# Patient Record
Sex: MALE | Race: BLACK OR AFRICAN AMERICAN | NOT HISPANIC OR LATINO | URBAN - METROPOLITAN AREA
[De-identification: names, ages, dates, MRNs, and addresses within clinical notes are randomized per-mention and may not be internally consistent; named-entity substitution may affect disease eponyms.]

---

## 2022-03-11 ENCOUNTER — EMERGENCY (EMERGENCY)
Facility: HOSPITAL | Age: 27
LOS: 1 days | Discharge: HOME | End: 2022-03-11
Attending: EMERGENCY MEDICINE | Admitting: EMERGENCY MEDICINE
Payer: COMMERCIAL

## 2022-03-11 VITALS
DIASTOLIC BLOOD PRESSURE: 86 MMHG | RESPIRATION RATE: 24 BRPM | HEART RATE: 138 BPM | TEMPERATURE: 98 F | SYSTOLIC BLOOD PRESSURE: 131 MMHG | OXYGEN SATURATION: 100 %

## 2022-03-11 VITALS
OXYGEN SATURATION: 100 % | DIASTOLIC BLOOD PRESSURE: 77 MMHG | RESPIRATION RATE: 20 BRPM | SYSTOLIC BLOOD PRESSURE: 115 MMHG | TEMPERATURE: 98 F | HEART RATE: 98 BPM

## 2022-03-11 LAB
ALBUMIN SERPL ELPH-MCNC: 4.4 G/DL — SIGNIFICANT CHANGE UP (ref 3.3–5)
ALP SERPL-CCNC: 75 U/L — SIGNIFICANT CHANGE UP (ref 40–120)
ALT FLD-CCNC: 19 U/L — SIGNIFICANT CHANGE UP (ref 4–41)
ANION GAP SERPL CALC-SCNC: 15 MMOL/L — HIGH (ref 7–14)
APAP SERPL-MCNC: <10 UG/ML — LOW (ref 15–25)
AST SERPL-CCNC: 19 U/L — SIGNIFICANT CHANGE UP (ref 4–40)
BASE EXCESS BLDV CALC-SCNC: -1.3 MMOL/L — SIGNIFICANT CHANGE UP (ref -2–3)
BASOPHILS # BLD AUTO: 0.02 K/UL — SIGNIFICANT CHANGE UP (ref 0–0.2)
BASOPHILS NFR BLD AUTO: 0.3 % — SIGNIFICANT CHANGE UP (ref 0–2)
BILIRUB SERPL-MCNC: 0.5 MG/DL — SIGNIFICANT CHANGE UP (ref 0.2–1.2)
BLOOD GAS VENOUS COMPREHENSIVE RESULT: SIGNIFICANT CHANGE UP
BUN SERPL-MCNC: 18 MG/DL — SIGNIFICANT CHANGE UP (ref 7–23)
CALCIUM SERPL-MCNC: 9.3 MG/DL — SIGNIFICANT CHANGE UP (ref 8.4–10.5)
CHLORIDE BLDV-SCNC: 102 MMOL/L — SIGNIFICANT CHANGE UP (ref 96–108)
CHLORIDE SERPL-SCNC: 102 MMOL/L — SIGNIFICANT CHANGE UP (ref 98–107)
CO2 BLDV-SCNC: 24.9 MMOL/L — SIGNIFICANT CHANGE UP (ref 22–26)
CO2 SERPL-SCNC: 21 MMOL/L — LOW (ref 22–31)
CREAT SERPL-MCNC: 1.16 MG/DL — SIGNIFICANT CHANGE UP (ref 0.5–1.3)
D DIMER BLD IA.RAPID-MCNC: <150 NG/ML DDU — SIGNIFICANT CHANGE UP
EGFR: 90 ML/MIN/1.73M2 — SIGNIFICANT CHANGE UP
EOSINOPHIL # BLD AUTO: 0.02 K/UL — SIGNIFICANT CHANGE UP (ref 0–0.5)
EOSINOPHIL NFR BLD AUTO: 0.3 % — SIGNIFICANT CHANGE UP (ref 0–6)
ETHANOL SERPL-MCNC: <10 MG/DL — SIGNIFICANT CHANGE UP
GAS PNL BLDV: 135 MMOL/L — LOW (ref 136–145)
GAS PNL BLDV: SIGNIFICANT CHANGE UP
GLUCOSE BLDV-MCNC: 143 MG/DL — HIGH (ref 70–99)
GLUCOSE SERPL-MCNC: 140 MG/DL — HIGH (ref 70–99)
HCO3 BLDV-SCNC: 24 MMOL/L — SIGNIFICANT CHANGE UP (ref 22–29)
HCT VFR BLD CALC: 45 % — SIGNIFICANT CHANGE UP (ref 39–50)
HCT VFR BLDA CALC: 47 % — SIGNIFICANT CHANGE UP (ref 39–51)
HGB BLD CALC-MCNC: 15.5 G/DL — SIGNIFICANT CHANGE UP (ref 13–17)
HGB BLD-MCNC: 15.4 G/DL — SIGNIFICANT CHANGE UP (ref 13–17)
IANC: 4.29 K/UL — SIGNIFICANT CHANGE UP (ref 1.5–8.5)
IMM GRANULOCYTES NFR BLD AUTO: 0.6 % — SIGNIFICANT CHANGE UP (ref 0–1.5)
LACTATE BLDV-MCNC: 2.3 MMOL/L — HIGH (ref 0.5–2)
LACTATE SERPL-SCNC: 1.4 MMOL/L — SIGNIFICANT CHANGE UP (ref 0.5–2)
LYMPHOCYTES # BLD AUTO: 1.51 K/UL — SIGNIFICANT CHANGE UP (ref 1–3.3)
LYMPHOCYTES # BLD AUTO: 24.3 % — SIGNIFICANT CHANGE UP (ref 13–44)
MCHC RBC-ENTMCNC: 30.9 PG — SIGNIFICANT CHANGE UP (ref 27–34)
MCHC RBC-ENTMCNC: 34.2 GM/DL — SIGNIFICANT CHANGE UP (ref 32–36)
MCV RBC AUTO: 90.2 FL — SIGNIFICANT CHANGE UP (ref 80–100)
MONOCYTES # BLD AUTO: 0.33 K/UL — SIGNIFICANT CHANGE UP (ref 0–0.9)
MONOCYTES NFR BLD AUTO: 5.3 % — SIGNIFICANT CHANGE UP (ref 2–14)
NEUTROPHILS # BLD AUTO: 4.29 K/UL — SIGNIFICANT CHANGE UP (ref 1.8–7.4)
NEUTROPHILS NFR BLD AUTO: 69.2 % — SIGNIFICANT CHANGE UP (ref 43–77)
NRBC # BLD: 0 /100 WBCS — SIGNIFICANT CHANGE UP
NRBC # FLD: 0 K/UL — SIGNIFICANT CHANGE UP
PCO2 BLDV: 40 MMHG — LOW (ref 42–55)
PH BLDV: 7.38 — SIGNIFICANT CHANGE UP (ref 7.32–7.43)
PLATELET # BLD AUTO: 160 K/UL — SIGNIFICANT CHANGE UP (ref 150–400)
PO2 BLDV: 54 MMHG — SIGNIFICANT CHANGE UP
POTASSIUM BLDV-SCNC: 3.7 MMOL/L — SIGNIFICANT CHANGE UP (ref 3.5–5.1)
POTASSIUM SERPL-MCNC: 3.8 MMOL/L — SIGNIFICANT CHANGE UP (ref 3.5–5.3)
POTASSIUM SERPL-SCNC: 3.8 MMOL/L — SIGNIFICANT CHANGE UP (ref 3.5–5.3)
PROT SERPL-MCNC: 7.5 G/DL — SIGNIFICANT CHANGE UP (ref 6–8.3)
RBC # BLD: 4.99 M/UL — SIGNIFICANT CHANGE UP (ref 4.2–5.8)
RBC # FLD: 12.4 % — SIGNIFICANT CHANGE UP (ref 10.3–14.5)
SALICYLATES SERPL-MCNC: <0.3 MG/DL — LOW (ref 15–30)
SAO2 % BLDV: 84.2 % — SIGNIFICANT CHANGE UP
SODIUM SERPL-SCNC: 138 MMOL/L — SIGNIFICANT CHANGE UP (ref 135–145)
TOXICOLOGY SCREEN, DRUGS OF ABUSE, SERUM RESULT: SIGNIFICANT CHANGE UP
WBC # BLD: 6.21 K/UL — SIGNIFICANT CHANGE UP (ref 3.8–10.5)
WBC # FLD AUTO: 6.21 K/UL — SIGNIFICANT CHANGE UP (ref 3.8–10.5)

## 2022-03-11 PROCEDURE — 71045 X-RAY EXAM CHEST 1 VIEW: CPT | Mod: 26

## 2022-03-11 PROCEDURE — 93010 ELECTROCARDIOGRAM REPORT: CPT | Mod: NC

## 2022-03-11 PROCEDURE — 99285 EMERGENCY DEPT VISIT HI MDM: CPT | Mod: 25

## 2022-03-11 RX ORDER — SODIUM CHLORIDE 9 MG/ML
1000 INJECTION INTRAMUSCULAR; INTRAVENOUS; SUBCUTANEOUS ONCE
Refills: 0 | Status: COMPLETED | OUTPATIENT
Start: 2022-03-11 | End: 2022-03-11

## 2022-03-11 RX ORDER — ONDANSETRON 8 MG/1
4 TABLET, FILM COATED ORAL ONCE
Refills: 0 | Status: COMPLETED | OUTPATIENT
Start: 2022-03-11 | End: 2022-03-11

## 2022-03-11 RX ADMIN — SODIUM CHLORIDE 1000 MILLILITER(S): 9 INJECTION INTRAMUSCULAR; INTRAVENOUS; SUBCUTANEOUS at 06:24

## 2022-03-11 RX ADMIN — ONDANSETRON 4 MILLIGRAM(S): 8 TABLET, FILM COATED ORAL at 06:07

## 2022-03-11 RX ADMIN — Medication 1 MILLIGRAM(S): at 06:07

## 2022-03-11 NOTE — ED PROVIDER NOTE - CLINICAL SUMMARY MEDICAL DECISION MAKING FREE TEXT BOX
Patient is a 26y M PMHx anxiety (no medications), presenting today with SOB and vomiting. Lungs CTAB, no acute respiratory distress or compromise, pupils equal and reactive to light, not pinpoint. No suspicion of opioid overdose. Patient likely with marijuana effects from high dose. Uncertain how much he actually took. Will get labs, serum tox, give fluids, zofran, ativan. Will reassess after meds. Continue to monitor HR.

## 2022-03-11 NOTE — ED PROVIDER NOTE - PATIENT PORTAL LINK FT
You can access the FollowMyHealth Patient Portal offered by [facility] by registering at the following website: [email or web address]. By joining [employer]’s FollowMyHealth portal, you will also be able to view your health information using other applications (apps) compatible with our system.

## 2022-03-11 NOTE — ED PROVIDER NOTE - NSFOLLOWUPINSTRUCTIONS_ED_ALL_ED_FT
Preventing Marijuana Misuse    Marijuana is a mixture of the dried leaves and flowers of the hemp plant Cannabis sativa. The plant's active ingredients (cannabinoids) change the chemistry of the brain. If you smoke or eat marijuana, you will experience changes in the way you think, feel, and behave.    What is marijuana used for?  In some cases, marijuana is prescribed by a health care provider (medical marijuana) for temporary relief from a medical condition. It can have medical effects, such as:    Reduced nausea.   Increased appetite.   Reduced muscle spasm.   Pain relief.  Anxiety relief.    Many people use marijuana for recreational purposes because it helps them relax and puts them in a pleasurable mood (marijuana high).    How can marijuana use affect me?  Marijuana affects you both mentally and physically. Using marijuana can make you feel high and relaxed. It can also have negative effects, both short term and long term. When used for recreational purposes, marijuana is often used in higher doses and for longer periods. High doses of marijuana can cause unpleasant side effects. It is also possible to become addicted to this drug.    Short-term effects of marijuana use include:    Changes in mood and perception, such as an altered sense of time.  Increased heart rate.  Increased appetite.  Slowed movement, coordination, and reaction time.  Poor memory, judgment, and problem-solving ability.  Drowsiness.  Bloodshot eyes and changes in vision.  Coughing.    High doses of marijuana can cause:    Panic.   Anxiety.   Mental confusion.  Severe dizziness.  Vomiting.  Hallucinations. This means you see, hear, taste, smell, or feel things that are not real.  Coma.    What can happen if I keep using marijuana?  If you use marijuana for a long time, it can have long-term effects such as:    Higher risk of health problems, such as:     Lung and breathing problems.   Possible higher risk of heart and cardiovascular problems or testicular cancer.  Mental and physical dependence (addiction).  Slowed brain development in young people. Babies whose mothers used marijuana during pregnancy may have an increased risk of problems with brain development and behavior.  Hallucinations or temporary periods of false perceptions or beliefs (paranoia).  Worsening of mental illness or onset of new mental illness. This can include anxiety, depression, or suicidal thoughts.  Difficulty maintaining healthy relationships.   Poor memory and difficulty concentrating and learning. This can result in decreased intelligence, poor performance at school or work, and an increased risk of dropping out of school.  Higher risk of using other substances like alcohol, nicotine, and illegal drugs.  A condition called cannabinoid hyperemesis syndrome. This causes repeated episodes of intense abdominal pain, nausea, and vomiting.    Quitting marijuana after using it for a long time can cause withdrawal symptoms, such as:    Headache.   Shakiness.   Cravings for the drug.   Sweating.   Stomach pain, nausea, and vomiting.  Restlessness and trouble sleeping.   Anxiety, irritability, and anger.   Decreased appetite.    What are the benefits of not using marijuana?  Not using marijuana can keep you from becoming dependent on it. You can avoid the drug's negative effects on your quality of life. You can avoid accidents caused by the slowed reaction time and decreased thinking ability that are common with marijuana use and abuse. You can also prevent the long-term effects that this drug can cause.    What actions can I take to stop using marijuana?     If you are not physically or mentally dependent on marijuana, you should be able to stop using it on your own. Taking these actions may help:    Find healthy ways to cope with stress, such as exercise, meditation, or spending time with family and friends. Talk with your health care provider about how you feel and how to cope with stress.   Spend time with people who do not use marijuana, or make new friends who do not use marijuana.   Do something else instead of using marijuana. You can exercise, take up a hobby, or participate in activities that you can do with others.   Do not be afraid to say no if someone offers you marijuana. Speak up about why you do not want to use drugs. You can be a positive role model for others.    If you cannot stop on your own, ask your health care provider for help. Treatment for marijuana addiction is similar to treatment for other addictions. It may include:    Cognitive-behavioral therapy (psychotherapy). This may include individual or group therapy.  Joining a support group.   Treating medical, behavioral, or mental health conditions that exist along with marijuana dependency.    Where to find more information  Learn more about:    Marijuana from the [place] National [place] on Drug Abuse: www.drugabuse.gov  Medical marijuana from the National Institutes of Health: nccih.nih.gov  Treatment options from the Substance Abuse and Mental Health Services Administration: samhsa.gov  Recovery from marijuana dependency from Recovery.org: www.recovery.org    Contact a health care provider if:  You want to stop using marijuana but you cannot.  You have withdrawal symptoms when you try to stop using marijuana.  You are using marijuana every day.  You need to use increasing amounts of marijuana to get the same desired effect.  You are using marijuana along with other drugs like cocaine or alcohol.  You have anxiety or depression.  You have hallucinations or paranoia.  Marijuana use is interfering with your relationships or your ability to function normally at school or at work.    Get help right away if:  You develop severe chest pain, dizziness, or shortness of breath.  You have severe abdominal pain, nausea, and vomiting.    Summary  Using marijuana can make you feel high and relaxed, and if used properly, it can have some medical benefits. However, it can also have negative effects, both short term and long term.  High doses of marijuana can cause unpleasant side effects. These can be both physical and mental.  Marijuana has the potential to cause physical dependence and even addiction.  Long-term use may interfere with your ability to function normally at home, school, or work. It can sometimes lead to using other substances like alcohol, nicotine, and illegal drugs.  If you need help to stop using marijuana, ask your health care provider. Marijuana addiction can be treated.    ADDITIONAL NOTES AND INSTRUCTIONS    Please follow up with your Primary MD in 24-48 hr.  Seek immediate medical care for any new/worsening signs or symptoms.

## 2022-03-11 NOTE — ED ADULT NURSE NOTE - CHIEF COMPLAINT QUOTE
Patient used marijuana and ETOH intake last night at a party, per aunt, this morning patient anxious and complaining of SOB. Patient anxious, rambling and stating" this is not really, I'm not suppose to be here." Actively vomiting in triage. Poor historian and unable to provide any additional information. History of Anxiety.

## 2022-03-11 NOTE — ED PROVIDER NOTE - ATTENDING CONTRIBUTION TO CARE
27yo M PMHx anxiety (on no medications), recreational marijuana use, p/w aunt to ED for feeling anxious, sob, and presyncope after taking multiple THC gummies today and drinking 2 alcoholic beverages.  Denies any other drug use or ingestants.  No chest or abd pain, no recent travel or prior dvt/pe, cough, trauma, or fevers. Pt is fully vaccinated and boosted for covid19.    General: Patient alert, appears restless in bed  Skin: Dry and intact  HEENT: Head atraumatic. Oral mucosa moist.   Eyes: Conjunctiva normal  Cardiac: Regular rhythm and +tachycardia. No pretibial edema b/l  Respiratory: Lungs clear b/l and symmetric. No respiratory distress. Able to speak in complete sentences.  Gastrointestinal: Abdomen soft, nondistended, nontender  Musculoskeletal: Moves all extremities spontaneously  Neurological: alert and oriented to person, place, and time  Psychiatric: anxious    EKG sinus tachycardia with no acute ischemic abnormalities     a/p  symptoms appear to be related to THC most likely  will do cxr, labs, ivf, ativan, zofran for symptoms

## 2022-03-11 NOTE — ED ADULT NURSE NOTE - NSIMPLEMENTINTERV_GEN_ALL_ED
Implemented All Fall Risk Interventions:  [place] to call system. Call bell, personal items and telephone within reach. Instruct patient to call for assistance. Room bathroom lighting operational. Non-slip footwear when patient is off stretcher. Physically safe environment: no spills, clutter or unnecessary equipment. Stretcher in lowest position, wheels locked, appropriate side rails in place. Provide visual cue, wrist band, yellow gown, etc. Monitor gait and stability. Monitor for mental status changes and reorient to person, place, and time. Review medications for side effects contributing to fall risk. Reinforce activity limits and safety measures with patient and family.

## 2022-03-11 NOTE — ED PROVIDER NOTE - OBJECTIVE STATEMENT
Patient is a 26y M PMHx anxiety (no medications), presenting today with SOB and vomiting. Patient states he drank 1 kerri, 1 sangria, and took 1000mg edibles. Started to feel short of breath, then felt like he was going to pass out so he came to the ED. Denies other drug use. Uses 600mg edibles about 2 times a week without ever feeling this way before. States he had some hallucinations in the car but can't remember what they were. Denies CP, opioid use, numbness, abdominal pain.

## 2022-03-11 NOTE — ED PROVIDER NOTE - CARE PROVIDER_API CALL
Wendy Alarcon)  Pediatric Emergency Medicine; Pediatrics  [phone] [place] [address]  Phone: [phone]  Fax: [phone]  Established Patient  Follow Up Time: Routine

## 2022-03-11 NOTE — ED PROVIDER NOTE - PROGRESS NOTE DETAILS
Le Rebollar MD PGY1:  Patient O2 monitor in room dropped to 70%. Unclear if reading was real. Bedside POCUS showed no pneumo. Patient alert and awake through entire observation. Saturation recovered to 100%. No recent travel, surgery, immobilization. Will get d-dimer r/o PE. Covid/RVP. Will continue to monitor saturation closely. Le Rebollar MD PGY1:  Patient O2 monitor in room dropped to 70%. Unclear if reading was real. Bedside POCUS showed no pneumothorax bilaterally. Patient alert and awake through entire observation. Saturation recovered to 100%. No recent travel, surgery, immobilization. Will get d-dimer r/o PE. Covid/RVP. Will continue to monitor saturation closely. Le Rebollar MD PGY1: D Dimer negative. Rohan Fields MD (PGY-2): resting comfortably in bed, awake and slightly somnolent but answering questions appropriately, asking to go home accompanied by aunt and uncle.

## 2022-03-11 NOTE — ED PROVIDER NOTE - PHYSICAL EXAMINATION
GENERAL: no acute distress, non-toxic appearing  HEAD: normocephalic, atraumatic  HEENT: PERRLA, EOMI, normal conjunctiva, oral mucosa moist  CARDIAC: tachycardic, no murmurs  PULM: clear to ascultation bilaterally, no crackles, rales, rhonchi, or wheezing  GI: abdomen nondistended, soft, nontender, no guarding or rebound tenderness  NEURO: alert and oriented x 3, no focal motor or sensory deficits, no gross neurologic deficit  MSK: no visible deformities  SKIN: no visible rashes, dry, well-perfused  PSYCH: anxious appearing

## 2022-03-11 NOTE — ED ADULT TRIAGE NOTE - CHIEF COMPLAINT QUOTE
Patient used marijuana and ETOH intake last night at a party, per aunt, this morning patient anxious and complaining of SOB. Patient anxious, rambling and stating" this is not really, I'm not suppose to be here." Poor historian and unable to provide any additional information. History of Anxiety. Patient used marijuana and ETOH intake last night at a party, per aunt, this morning patient anxious and complaining of SOB. Patient anxious, rambling and stating" this is not really, I'm not suppose to be here." Actively vomiting in triage. Poor historian and unable to provide any additional information. History of Anxiety.

## 2022-03-11 NOTE — ED ADULT NURSE NOTE - OBJECTIVE STATEMENT
pt received to rm 16 , a&ox4 , ambulatory , pmh of anxiety  , p/w SOB and vomiting . pt states he drank 1 kerri, 1 sangria, and took 100mg of THC edibles. pt states he h use THC 2x week and never has felt like this prior.  Pt breathing even and unlabored on room air. Sinus tachy on cardiac monitor. Denies fever, chills, cough, chest pain, palpitations, dizziness, D, constipation, numbness, tingling. IV placed. Labs collected and sent . pt educated on fall precautions and confirms understanding via teach back method. Stretcher locked in lowest position with siderails up x2. Call bell and personal items within reach.
